# Patient Record
(demographics unavailable — no encounter records)

---

## 2025-06-25 NOTE — PHYSICAL EXAM
[Alert] : alert [No Acute Distress] : no acute distress [Normal Sclera/Conjunctiva] : normal sclera/conjunctiva [EOMI] : extra ocular movement intact [No Proptosis] : no proptosis [No LAD] : no lymphadenopathy [Thyroid Not Enlarged] : the thyroid was not enlarged [No Thyroid Nodules] : no palpable thyroid nodules [No Respiratory Distress] : no respiratory distress [Clear to Auscultation] : lungs were clear to auscultation bilaterally [Normal S1, S2] : normal S1 and S2 [Regular Rhythm] : with a regular rhythm [No Edema] : no peripheral edema [Normal Bowel Sounds] : normal bowel sounds [Not Tender] : non-tender [Not Distended] : not distended [Soft] : abdomen soft [Normal Anterior Cervical Nodes] : no anterior cervical lymphadenopathy [No Clubbing, Cyanosis] : no clubbing  or cyanosis of the fingernails [No Rash] : no rash [Normal Reflexes] : deep tendon reflexes were 2+ and symmetric [No Tremors] : no tremors [Normal Affect] : the affect was normal [Normal Mood] : the mood was normal [de-identified] : 2/6 GOGO

## 2025-06-25 NOTE — HISTORY OF PRESENT ILLNESS
[FreeTextEntry1] : 66 y.o. female with h/o hyperthyroidism diagnosed in June 2019 presents for follow up visit.   Patient reports feeling well when hyperthyroidism was detected by her PCP on routine lab work. Started Methimazole 10 mg daily on 9/13/19 for presumed Graves disease and tapered to off in November 2020. Then restarted in 2021 and stopped it on 12/14/21.    She was taking Methimazole 2.5 mg every other day until March 2024.  She is now off Methimazole since March 2024.    No eye complaints. No neck complaints. No dysphagia or dysphonia. No head or neck RT exposure. No iodine contrast. No fevers or recent illness. No new medications including Lithium or Amiodarone.   Saw new PCP in 2021 and diagnosed with hyperlipidemia. Diet has been off track recently given family stress.    Had COVID-19 in September 2022 with mild symptoms.   Reports a lot of stress with 's diagnosis of gastric cancer (he had surgery on 2/15/23). [place] at [facility] [place] since developed recurrence. S/p RT.   Her mother passed in November 2024 secondary to dementia.   Reports diet is off track.   She reports weight gain. Less exercise. Reports fatigue and hair loss. Less hot flashes. Normal bowel movements. No palpitations. No tremors.     Reports mother may have had thyroid disease years ago. No other autoimmune diseases in the family. PCP checked TFTs in June 2019 with TSH of 0.01 and Free T4 4.0. In June 2019 had 24 hour thyroid uptake and scan showing 24 hour uptake of 17% which was uniform and not consistent with Graves disease or toxic nodule. Patient then saw endocrinology in July 2019 with repeat testing showing TSH <0.01 T4 of 7 and positive TPO ab and Tg ab. Diagnosed presumed to be subacute thyroiditis and monitoring was recommended.   Thyroid ultrasound on 9/16/19 shows prominent heterogenous gland without discrete nodules.

## 2025-06-25 NOTE — HISTORY OF PRESENT ILLNESS
[FreeTextEntry1] : 66 y.o. female with h/o hyperthyroidism diagnosed in June 2019 presents for follow up visit.   Patient reports feeling well when hyperthyroidism was detected by her PCP on routine lab work. Started Methimazole 10 mg daily on 9/13/19 for presumed Graves disease and tapered to off in November 2020. Then restarted in 2021 and stopped it on 12/14/21.    She was taking Methimazole 2.5 mg every other day until March 2024.  She is now off Methimazole since March 2024.    No eye complaints. No neck complaints. No dysphagia or dysphonia. No head or neck RT exposure. No iodine contrast. No fevers or recent illness. No new medications including Lithium or Amiodarone.   Saw new PCP in 2021 and diagnosed with hyperlipidemia. Diet has been off track recently given family stress.    Had COVID-19 in September 2022 with mild symptoms.   Reports a lot of stress with 's diagnosis of gastric cancer (he had surgery on 2/15/23). [place] [facility] [place]. S/p RT.   Her mother passed in November 2024 secondary to dementia.   Reports diet is off track.   She reports weight gain. Less exercise. Reports fatigue and hair loss. Less hot flashes. Normal bowel movements. No palpitations. No tremors.     Reports mother may have had thyroid disease years ago. No other autoimmune diseases in the family. PCP checked TFTs in June 2019 with TSH of 0.01 and Free T4 4.0. In June 2019 had 24 hour thyroid uptake and scan showing 24 hour uptake of 17% which was uniform and not consistent with Graves disease or toxic nodule. Patient then saw endocrinology in July 2019 with repeat testing showing TSH <0.01 T4 of 7 and positive TPO ab and Tg ab. Diagnosed presumed to be subacute thyroiditis and monitoring was recommended.   Thyroid ultrasound on 9/16/19 shows prominent heterogenous gland without discrete nodules.

## 2025-06-25 NOTE — ASSESSMENT
[FreeTextEntry1] : 66 y.o. female with h/o hyperthyroidism/Graves' disease, overweight and hyperlipidemia.  1. Hyperthyroidism- Discussed pathophysiology and differential diagnosis which includes Graves' disease, toxic nodule and thyroiditis. Discussed long term risks of untreated hyperthyroidism including bone loss and cardiac disease. Suspect h/o Graves' disease given elevated TSI. Patient is euthyroid. Will continue to monitor off Methimazole.   2. Overweight- Encouraged a carbohydrate consistent diet with portion control and exercise. Hba1c was normal at 5.4% in September 2024.   3. Hyperlipidemia- UTD with lipids. Encouraged a low-fat diet and exercise. Recommend cardiology evaluation.    Follow up in 6 months.

## 2025-06-25 NOTE — REVIEW OF SYSTEMS
[Fatigue] : fatigue [Joint Pain] : joint pain [Hair Loss] : hair loss [Hot Flashes] : hot flashes [Negative] : Psychiatric [All other systems negative] : All other systems negative [Recent Weight Gain (___ Lbs)] : recent weight gain: [unfilled] lbs [Recent Weight Loss (___ Lbs)] : no recent weight loss [Swelling] : no swelling

## 2025-06-25 NOTE — PHYSICAL EXAM
[Alert] : alert [No Acute Distress] : no acute distress [Normal Sclera/Conjunctiva] : normal sclera/conjunctiva [EOMI] : extra ocular movement intact [No Proptosis] : no proptosis [No LAD] : no lymphadenopathy [Thyroid Not Enlarged] : the thyroid was not enlarged [No Thyroid Nodules] : no palpable thyroid nodules [No Respiratory Distress] : no respiratory distress [Clear to Auscultation] : lungs were clear to auscultation bilaterally [Normal S1, S2] : normal S1 and S2 [Regular Rhythm] : with a regular rhythm [No Edema] : no peripheral edema [Normal Bowel Sounds] : normal bowel sounds [Not Tender] : non-tender [Not Distended] : not distended [Soft] : abdomen soft [Normal Anterior Cervical Nodes] : no anterior cervical lymphadenopathy [No Clubbing, Cyanosis] : no clubbing  or cyanosis of the fingernails [No Rash] : no rash [Normal Reflexes] : deep tendon reflexes were 2+ and symmetric [No Tremors] : no tremors [Normal Affect] : the affect was normal [Normal Mood] : the mood was normal [de-identified] : 2/6 GOGO